# Patient Record
Sex: MALE | Race: BLACK OR AFRICAN AMERICAN | NOT HISPANIC OR LATINO | RURAL
[De-identification: names, ages, dates, MRNs, and addresses within clinical notes are randomized per-mention and may not be internally consistent; named-entity substitution may affect disease eponyms.]

---

## 2024-04-17 ENCOUNTER — HOSPITAL ENCOUNTER (EMERGENCY)
Facility: HOSPITAL | Age: 24
Discharge: LEFT BEFORE DISCHARGE | End: 2024-04-17

## 2024-04-17 VITALS
RESPIRATION RATE: 16 BRPM | TEMPERATURE: 98 F | OXYGEN SATURATION: 97 % | BODY MASS INDEX: 27.35 KG/M2 | SYSTOLIC BLOOD PRESSURE: 125 MMHG | HEIGHT: 75 IN | HEART RATE: 58 BPM | DIASTOLIC BLOOD PRESSURE: 73 MMHG | WEIGHT: 220 LBS

## 2024-04-17 DIAGNOSIS — R36.9 PENILE DISCHARGE: Primary | ICD-10-CM

## 2024-04-17 LAB
BACTERIA #/AREA URNS HPF: ABNORMAL /HPF
BILIRUB UR QL STRIP: NEGATIVE
CLARITY UR: CLEAR
COLOR UR: COLORLESS
GLUCOSE UR STRIP-MCNC: NORMAL MG/DL
KETONES UR STRIP-SCNC: NEGATIVE MG/DL
LEUKOCYTE ESTERASE UR QL STRIP: ABNORMAL
NITRITE UR QL STRIP: NEGATIVE
PH UR STRIP: 5 PH UNITS
PROT UR QL STRIP: NEGATIVE
RBC # UR STRIP: NEGATIVE /UL
RBC #/AREA URNS HPF: 2 /HPF
SP GR UR STRIP: 1.01
SQUAMOUS #/AREA URNS LPF: ABNORMAL /HPF
UROBILINOGEN UR STRIP-ACNC: NORMAL MG/DL
WBC #/AREA URNS HPF: 15 /HPF

## 2024-04-17 PROCEDURE — 87086 URINE CULTURE/COLONY COUNT: CPT | Performed by: NURSE PRACTITIONER

## 2024-04-17 PROCEDURE — 87591 N.GONORRHOEAE DNA AMP PROB: CPT | Performed by: NURSE PRACTITIONER

## 2024-04-17 PROCEDURE — 81003 URINALYSIS AUTO W/O SCOPE: CPT | Performed by: NURSE PRACTITIONER

## 2024-04-17 PROCEDURE — 99282 EMERGENCY DEPT VISIT SF MDM: CPT

## 2024-04-17 PROCEDURE — 99283 EMERGENCY DEPT VISIT LOW MDM: CPT | Mod: ,,, | Performed by: NURSE PRACTITIONER

## 2024-04-17 RX ORDER — CEFTRIAXONE 500 MG/1
500 INJECTION, POWDER, FOR SOLUTION INTRAMUSCULAR; INTRAVENOUS
Status: DISCONTINUED | OUTPATIENT
Start: 2024-04-17 | End: 2024-04-17 | Stop reason: HOSPADM

## 2024-04-17 NOTE — ED PROVIDER NOTES
Encounter Date: 4/17/2024       History     Chief Complaint   Patient presents with    Penile Discharge     Patient reports unprotected sex recently     23-year-old male presents to the emergency department to be evaluated for penile discharge that began yesterday.  He is concerned that he may have an STD.  He has multiple sexual partners.  Denies any fever, chills, nausea, vomiting.    The history is provided by the patient.   Penile Discharge  This is a new problem. Pertinent negatives include no chest pain, no abdominal pain, no headaches and no shortness of breath.     Review of patient's allergies indicates:  No Known Allergies  No past medical history on file.  No past surgical history on file.  No family history on file.  Social History     Tobacco Use    Smoking status: Never    Smokeless tobacco: Never   Substance Use Topics    Alcohol use: Never    Drug use: Never     Review of Systems   Constitutional:  Negative for chills and fever.   Respiratory:  Negative for shortness of breath.    Cardiovascular:  Negative for chest pain.   Gastrointestinal:  Negative for abdominal pain.   Genitourinary:  Positive for penile discharge.   Neurological:  Negative for headaches.   All other systems reviewed and are negative.      Physical Exam     Initial Vitals [04/17/24 1207]   BP Pulse Resp Temp SpO2   125/73 (!) 58 16 97.8 °F (36.6 °C) 97 %      MAP       --         Physical Exam    Vitals reviewed.  Constitutional: He appears well-developed and well-nourished.   Neck: Neck supple.   Cardiovascular:  Normal rate and regular rhythm.           Pulmonary/Chest: Breath sounds normal.   Abdominal: Abdomen is soft. Bowel sounds are normal. He exhibits no distension and no mass. There is no abdominal tenderness. There is no rebound and no guarding.   Genitourinary:    Genitourinary Comments: Patient eloped prior to  exam     Musculoskeletal:         General: Normal range of motion.      Cervical back: Neck supple.      Neurological: He is alert and oriented to person, place, and time. He has normal strength. GCS score is 15. GCS eye subscore is 4. GCS verbal subscore is 5. GCS motor subscore is 6.   Skin: Skin is warm and dry. Capillary refill takes less than 2 seconds.   Psychiatric: He has a normal mood and affect.         Medical Screening Exam   See Full Note    ED Course   Procedures  Labs Reviewed   URINALYSIS - Abnormal; Notable for the following components:       Result Value    Leukocytes, UA Large (*)     All other components within normal limits   URINALYSIS, MICROSCOPIC - Abnormal; Notable for the following components:    WBC, UA 15 (*)     Bacteria, UA Occasional (*)     Squamous Epithelial Cells, UA Occasional (*)     All other components within normal limits   CHLAMYDIA/GONORRHOEAE(GC), PCR   CULTURE, URINE          Imaging Results    None          Medications   cefTRIAXone injection 500 mg (has no administration in time range)     Medical Decision Making  23-year-old male presents to the emergency department to be evaluated for penile discharge that began yesterday.  He is concerned that he may have an STD.  He has multiple sexual partners.  Denies any fever, chills, nausea, vomiting.  Diagnosis: Penile discharge  Urinalysis ordered and reviewed  Patient eloped prior to receiving the Rocephin  GC pending      Amount and/or Complexity of Data Reviewed  Labs: ordered.    Risk  Prescription drug management.                                      Clinical Impression:   Final diagnoses:  [R36.9] Penile discharge (Primary)        ED Disposition Condition    Eloped Stable                Ana María Purcell, [facility]  04/17/24 0484

## 2024-04-18 ENCOUNTER — TELEPHONE (OUTPATIENT)
Dept: EMERGENCY MEDICINE | Facility: HOSPITAL | Age: 24
End: 2024-04-18

## 2024-04-18 LAB
CHLAMYDIA BY PCR: NEGATIVE
N. GONORRHOEAE (GC) BY PCR: NEGATIVE

## 2024-04-18 NOTE — TELEPHONE ENCOUNTER
PHYSICIAN INTERPRETATION   HOME OXIMETRY   Patient: Tucker Slater  MRN: [id number]  YOB: 1930  Study Date: 7/1/18   Referring Physician: Kwadwo Winslow  Ordering Physician: PAUL DANIEL MD     Conditions:  Room air  Quality: artifact-free     Measure [threshold]                 Time less than or equal to SpO2 88% [5 min]:  62 min  Duration monitoring [> 2 hours artifact free]:  5.2 hours                    4% O2 desat index [ > 15/ hour]:    47/ hour                    Pattern:       oscillatory                                  Assessment:   Hypoxemia present  Episodic pattern suggestive of central or obstructive sleep apnea    Recommendations:  Sleep study would be helpful to define nature of sleep apnea and response to PAP and/or oxygen.      Diagnosis Code(s):  Hypoxemia G47.36, Sleep apnea G47.33   PAUL DANIEL MD, July 6, 2018              Pt advised me that I interrupted him while he was playing a game with his cousin on his phone. Apologized to pt for interrupting his game but that was not my intent. I was just wanting to check on him. Advised pt that he was only tested for 2 STD's in the ED yesterday, so needs to f/u with his PCP or the local health department to get a full STD check.

## 2024-04-19 LAB — UA COMPLETE W REFLEX CULTURE PNL UR: NORMAL

## 2025-06-16 ENCOUNTER — CLINICAL SUPPORT (OUTPATIENT)
Dept: PRIMARY CARE CLINIC | Facility: CLINIC | Age: 25
End: 2025-06-16

## 2025-06-16 DIAGNOSIS — Z02.4 ENCOUNTER FOR DEPARTMENT OF TRANSPORTATION (DOT) EXAMINATION FOR DRIVING LICENSE RENEWAL: Primary | ICD-10-CM

## 2025-06-16 PROCEDURE — 99499 UNLISTED E&M SERVICE: CPT | Mod: ,,, | Performed by: NURSE PRACTITIONER

## 2025-06-16 NOTE — PROGRESS NOTES
Patient ID: Benigno Briones is a 25 y.o. male.    Chief Complaint: No chief complaint on file.    History of Present Illness              Physical Exam              Assessment & Plan               1. Encounter for Department of Transportation (DOT) examination for driving license renewal        No follow-ups on file.    This note was generated with the assistance of ambient listening technology. Verbal consent was obtained by the patient and accompanying visitor(s) for the recording of patient appointment to facilitate this note. I attest to having reviewed and edited the generated note for accuracy, though some syntax or spelling errors may persist. Please contact the author of this note for any clarification.